# Patient Record
Sex: FEMALE | Race: OTHER
[De-identification: names, ages, dates, MRNs, and addresses within clinical notes are randomized per-mention and may not be internally consistent; named-entity substitution may affect disease eponyms.]

---

## 2023-03-17 ENCOUNTER — HOSPITAL ENCOUNTER (EMERGENCY)
Dept: HOSPITAL 12 - ER | Age: 23
LOS: 1 days | Discharge: HOME | End: 2023-03-18
Payer: MEDICAID

## 2023-03-17 VITALS — WEIGHT: 147 LBS | BODY MASS INDEX: 24.49 KG/M2 | HEIGHT: 65 IN
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
## 2023-03-17 DIAGNOSIS — S16.1XXA: Primary | ICD-10-CM
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
## 2023-03-17 PROCEDURE — A4663 DIALYSIS BLOOD PRESSURE CUFF: HCPCS

## 2023-03-18 VITALS — DIASTOLIC BLOOD PRESSURE: 79 MMHG | SYSTOLIC BLOOD PRESSURE: 125 MMHG

## 2023-03-18 NOTE — NUR
Patient AOX4, speech is clear. Ambulated indepedently to [facility]. No signs of 
distress.